# Patient Record
Sex: MALE | Race: WHITE | URBAN - METROPOLITAN AREA
[De-identification: names, ages, dates, MRNs, and addresses within clinical notes are randomized per-mention and may not be internally consistent; named-entity substitution may affect disease eponyms.]

---

## 2019-08-20 ENCOUNTER — OFFICE VISIT (OUTPATIENT)
Dept: URBAN - METROPOLITAN AREA CLINIC 18 | Facility: CLINIC | Age: 44
End: 2019-08-20
Payer: COMMERCIAL

## 2019-08-20 DIAGNOSIS — H17.89 OTHER CORNEAL SCARS: ICD-10-CM

## 2019-08-20 PROCEDURE — 92002 INTRM OPH EXAM NEW PATIENT: CPT | Performed by: OPTOMETRIST

## 2019-08-20 ASSESSMENT — INTRAOCULAR PRESSURE
OD: 16
OS: 17

## 2019-08-20 NOTE — IMPRESSION/PLAN
Impression: Other corneal scars: H17.89. OD. Scar not in central vision Hx of eye trauma as a child Plan: Advised patient of condition. No treatment is required at this time.

## 2019-08-20 NOTE — IMPRESSION/PLAN
Impression: Dry eye syndrome of bilateral lacrimal glands: H04.123. Patient uses allergy pills 2-3x's a week Plan: Advised patient of condition. Made patient aware that antihistamines and allergy medications contribute to dryness. Advised to stop using to help reduce dryness. Recommended to continue using eye ointment at night before bed and to use artifical tears frequently throughout the day. Avoid rubbing or touching the eye to reduce discharge. Discussed other treatment options to help with tear production in the future if artifical tears don't improve condition.

## 2019-09-17 ENCOUNTER — OFFICE VISIT (OUTPATIENT)
Dept: URBAN - METROPOLITAN AREA CLINIC 18 | Facility: CLINIC | Age: 44
End: 2019-09-17
Payer: COMMERCIAL

## 2019-09-17 PROCEDURE — 92012 INTRM OPH EXAM EST PATIENT: CPT | Performed by: OPTOMETRIST

## 2019-09-17 RX ORDER — CYCLOSPORINE 0.5 MG/ML
0.05 % EMULSION OPHTHALMIC
Qty: 60 | Refills: 3 | Status: INACTIVE
Start: 2019-09-17 | End: 2019-11-18

## 2019-09-17 ASSESSMENT — VISUAL ACUITY
OD: 20/20
OS: 20/20

## 2019-09-17 NOTE — IMPRESSION/PLAN
Impression: Dry eye syndrome of bilateral lacrimal glands: H04.123. Patient uses allergy pills 2-3x's a week Plan: Advised patient of condition. Made patient aware that antihistamines and allergy medications contribute to dryness. Advised to stop using to help reduce dryness. Recommended to continue using eye ointment at night before bed and to use artificial tears frequently throughout the day. Avoid rubbing or touching the eye to reduce discharge. Discussed other treatment options to help with tear production in the future if artificial tears don't improve condition. Trial Restasis q12h OU.

## 2019-09-17 NOTE — IMPRESSION/PLAN
Impression: Hypermetropia, bilateral: H52.03. Plan: New glasses Rx were given today. Recommend bifocals either lined or no line. Discussed how to get used to bifocals.

## 2019-10-15 ENCOUNTER — OFFICE VISIT (OUTPATIENT)
Dept: URBAN - METROPOLITAN AREA CLINIC 18 | Facility: CLINIC | Age: 44
End: 2019-10-15
Payer: COMMERCIAL

## 2019-10-15 DIAGNOSIS — H04.123 DRY EYE SYNDROME OF BILATERAL LACRIMAL GLANDS: Primary | ICD-10-CM

## 2019-10-15 PROCEDURE — 92012 INTRM OPH EXAM EST PATIENT: CPT | Performed by: OPTOMETRIST

## 2019-10-15 RX ORDER — LIFITEGRAST 50 MG/ML
5 % SOLUTION/ DROPS OPHTHALMIC
Qty: 60 | Refills: 1 | Status: INACTIVE
Start: 2019-10-15 | End: 2019-11-18

## 2019-10-15 NOTE — IMPRESSION/PLAN
Impression: Dry eye syndrome of bilateral lacrimal glands: H04.123. Patient stopped using allergy drops sine advised. OS>OD Plan: Discontinue Restasis at this time (patient showed no improvement). In replace of Restasis, recommended to start Purificacion 1076 OU (erx'd today). Continue using artificial tears frequently through out the day and ointment every night. Will re-asses in 4 weeks.

## 2019-11-18 ENCOUNTER — OFFICE VISIT (OUTPATIENT)
Dept: URBAN - METROPOLITAN AREA CLINIC 18 | Facility: CLINIC | Age: 44
End: 2019-11-18
Payer: COMMERCIAL

## 2019-11-18 PROCEDURE — 99213 OFFICE O/P EST LOW 20 MIN: CPT | Performed by: OPTOMETRIST

## 2019-11-18 RX ORDER — LIFITEGRAST 50 MG/ML
5 % SOLUTION/ DROPS OPHTHALMIC
Qty: 60 | Refills: 3 | Status: INACTIVE
Start: 2019-11-18 | End: 2020-02-24

## 2019-11-18 ASSESSMENT — KERATOMETRY
OS: 41.00
OD: 42.00

## 2019-11-18 NOTE — IMPRESSION/PLAN
Impression: Dry eye syndrome of bilateral lacrimal glands: H04.123. Patient stopped using allergy drops sine advised. OS>OD Plan: Discontinued Restasis (patient showed no improvement). Continue Xiidra Q12h OU (erx'd today). Continue using artificial tears frequently through out the day and ointment every night. Will re-asses in 4 weeks.

## 2020-02-24 ENCOUNTER — OFFICE VISIT (OUTPATIENT)
Dept: URBAN - METROPOLITAN AREA CLINIC 18 | Facility: CLINIC | Age: 45
End: 2020-02-24
Payer: COMMERCIAL

## 2020-02-24 PROCEDURE — 92012 INTRM OPH EXAM EST PATIENT: CPT | Performed by: OPTOMETRIST

## 2020-02-24 RX ORDER — LIFITEGRAST 50 MG/ML
5 % SOLUTION/ DROPS OPHTHALMIC
Qty: 60 | Refills: 3 | Status: INACTIVE
Start: 2020-02-24 | End: 2020-10-05

## 2020-02-24 ASSESSMENT — INTRAOCULAR PRESSURE
OD: 22
OS: 21
OD: 21

## 2020-02-24 ASSESSMENT — KERATOMETRY
OS: 41.00
OD: 41.50

## 2020-02-24 NOTE — IMPRESSION/PLAN
Impression: Dry eye syndrome of bilateral lacrimal glands: H04.123. Patient stopped using allergy drops sine advised. OS>OD Plan: Discontinued Restasis (patient showed no improvement). Continue Xiidra Q12h OU (no help). Continue using artificial tears frequently through out the day and ointment every night. Will refer to [facility] dry eye clinic.

## 2020-10-19 ENCOUNTER — OFFICE VISIT (OUTPATIENT)
Dept: URBAN - METROPOLITAN AREA CLINIC 18 | Facility: CLINIC | Age: 45
End: 2020-10-19
Payer: COMMERCIAL

## 2020-10-19 DIAGNOSIS — H52.03 HYPERMETROPIA, BILATERAL: Primary | ICD-10-CM

## 2020-10-19 PROCEDURE — 92012 INTRM OPH EXAM EST PATIENT: CPT | Performed by: OPTOMETRIST

## 2020-10-19 ASSESSMENT — VISUAL ACUITY
OS: 20/20
OD: 20/20

## 2020-10-19 ASSESSMENT — INTRAOCULAR PRESSURE
OD: 18
OS: 18